# Patient Record
(demographics unavailable — no encounter records)

---

## 2025-05-28 NOTE — PHYSICAL EXAM
[Healthy Appearing] : healthy appearing [Well Nourished] : well nourished [Interactive] : interactive [Normal Appearance] : normal appearance [Well formed] : well formed [Normally Set] : normally set [Normal S1 and S2] : normal S1 and S2 [Murmur] : no murmurs [Clear to Ausculation Bilaterally] : clear to auscultation bilaterally [Abdomen Soft] : soft [Abdomen Tenderness] : non-tender [] : no hepatosplenomegaly [___] : [unfilled] [Normal] : normal  [FreeTextEntry2] : 3 x 1 cm movable mass over rt nipple, smnall amout of soft bresat tissue

## 2025-05-28 NOTE — HISTORY OF PRESENT ILLNESS
[FreeTextEntry2] : Hernandez is a 15 year old who is referred for right sided gynecomastia. He thinks that it has been there for couple of months.  Both mom and the patient feel that the breast development may have been somewhat larger but now appears smaller.  It is intermittently tender.  Hernandez is a healthy boy.  He has not needed to be seen by any other specialists except for an allergist.  He is not on any medications and there is no family history of gynecomastia